# Patient Record
Sex: FEMALE | URBAN - METROPOLITAN AREA
[De-identification: names, ages, dates, MRNs, and addresses within clinical notes are randomized per-mention and may not be internally consistent; named-entity substitution may affect disease eponyms.]

---

## 2018-12-18 ENCOUNTER — APPOINTMENT (RX ONLY)
Dept: URBAN - METROPOLITAN AREA CLINIC 154 | Facility: CLINIC | Age: 38
Setting detail: DERMATOLOGY
End: 2018-12-18

## 2018-12-18 DIAGNOSIS — Z41.9 ENCOUNTER FOR PROCEDURE FOR PURPOSES OTHER THAN REMEDYING HEALTH STATE, UNSPECIFIED: ICD-10-CM

## 2018-12-18 PROCEDURE — ? FILLERS

## 2018-12-18 PROCEDURE — ? ICON IPL

## 2018-12-18 ASSESSMENT — LOCATION ZONE DERM: LOCATION ZONE: FACE

## 2018-12-18 ASSESSMENT — LOCATION DETAILED DESCRIPTION DERM
LOCATION DETAILED: RIGHT INFERIOR LATERAL FOREHEAD
LOCATION DETAILED: RIGHT LATERAL MALAR CHEEK
LOCATION DETAILED: LEFT LATERAL MALAR CHEEK
LOCATION DETAILED: LEFT INFERIOR LATERAL FOREHEAD

## 2018-12-18 ASSESSMENT — LOCATION SIMPLE DESCRIPTION DERM
LOCATION SIMPLE: LEFT FOREHEAD
LOCATION SIMPLE: RIGHT FOREHEAD
LOCATION SIMPLE: RIGHT CHEEK
LOCATION SIMPLE: LEFT CHEEK

## 2018-12-18 NOTE — PROCEDURE: FILLERS
Mid Face Filler  Volume In Cc: 0
Expiration Date (Month Year): 03/05/20
Include Cannula Information In Note?: No
Lot #: [id number]
Use Map Statement For Sites (Optional): Yes
Lot #: [id number]
Map Statment: See Attach Map for Complete Details
Tear Troughs Filler  Volume In Cc: 1
Expiration Date (Month Year): 08/04/19
Post-Care Instructions: Patient instructed to apply ice to reduce swelling.
Consent: Written consent obtained. Risks include but not limited to bruising, beading, irregular texture, ulceration, infection, allergic reaction, scar formation, incomplete augmentation, temporary nature, procedural pain.
Additional Area 1 Location: UPPER JOSE ORAL
Topical Anesthesia?: 23% lidocaine, 7% tetracaine
Filler: Restylane-L
Detail Level: Simple

## 2018-12-18 NOTE — PROCEDURE: ICON IPL
Detail Level: Zone
Anesthesia Volume In Cc: 0
Consent: Written consent obtained, risks reviewed including but not limited to crusting, scabbing, blistering, scarring, darker or lighter pigmentary change, bruising, and/or incomplete response.
Contact: Light
Passes: 1
External Cooling Fan Speed: 5
Post-Care Instructions: I reviewed with the patient in detail post-care instructions. Patient should stay away from the sun and wear sun protection until treated areas are fully healed.
Energy (Optional- Include Units): 15/34
Pre-Procedure Text: After consent was obtained, the treatment areas were cleansed and treated using the parameters mentioned above.

## 2020-05-06 ENCOUNTER — APPOINTMENT (RX ONLY)
Dept: URBAN - METROPOLITAN AREA CLINIC 154 | Facility: CLINIC | Age: 40
Setting detail: DERMATOLOGY
End: 2020-05-06

## 2020-05-06 DIAGNOSIS — Z41.9 ENCOUNTER FOR PROCEDURE FOR PURPOSES OTHER THAN REMEDYING HEALTH STATE, UNSPECIFIED: ICD-10-CM

## 2020-05-06 PROCEDURE — ? MICRONEEDLING

## 2020-05-06 PROCEDURE — ? FILLERS

## 2020-05-06 NOTE — PROCEDURE: MICRONEEDLING
Detail Level: Zone
Depth In Mm: 1.5
Infusions (Optional): PRP
Depth In Mm: 1
Post-Care Instructions: After the procedure, take precautions agains sun exposure. Do not apply sunscreen for 12 hours after the procedure. Do not apply make-up for 12 hours after the procedure. Avoid alcohol based toners for 10-14 days. After 2-3 days patients can return to their regular skin regimen.
Consent: Written consent obtained, risks reviewed including but not limited to pain, scarring, infection and incomplete improvement.  Patient understands the procedure is cosmetic in nature and will require out of pocket payment.
Depth In Mm: 2.5
Treatment Number (Optional): 0
Location #1: full face

## 2020-05-06 NOTE — PROCEDURE: FILLERS
Additional Area 3 Volume In Cc: 0
Lot #: [id number]
Detail Level: Simple
Filler: Restylane-L
Expiration Date (Month Year): 03/05/20
Include Cannula Information In Note?: No
Use Map Statement For Sites (Optional): Yes
Additional Area 1 Location: UPPER JOSE ORAL
Map Statment: See Attach Map for Complete Details
Tear Troughs Filler  Volume In Cc: 1
Lot #: [id number]
Expiration Date (Month Year): 09/10/20
Consent: Written consent obtained. Risks include but not limited to bruising, beading, irregular texture, ulceration, infection, allergic reaction, scar formation, incomplete augmentation, temporary nature, procedural pain.
Post-Care Instructions: Patient instructed to apply ice to reduce swelling.
Topical Anesthesia?: 23% lidocaine, 7% tetracaine

## 2020-06-03 ENCOUNTER — APPOINTMENT (RX ONLY)
Dept: URBAN - METROPOLITAN AREA CLINIC 154 | Facility: CLINIC | Age: 40
Setting detail: DERMATOLOGY
End: 2020-06-03

## 2020-06-03 DIAGNOSIS — Z41.9 ENCOUNTER FOR PROCEDURE FOR PURPOSES OTHER THAN REMEDYING HEALTH STATE, UNSPECIFIED: ICD-10-CM

## 2020-06-03 PROCEDURE — ? PLATELET RICH PLASMA INJECTION

## 2020-06-03 NOTE — PROCEDURE: PLATELET RICH PLASMA INJECTION
Depth In Mm (Will Not Render If 0): 0
Detail Level: Zone
Amount Injected At This Location In Cc (Will Not Render If 0): 10
Post-Care Instructions: After the procedure, take precautions agains sun exposure. Do not apply sunscreen for 12 hours after the procedure. Do not apply make-up for 12 hours after the procedure. Avoid alcohol based toners for 10-14 days. After 2-3 days patients can return to their regular skin regimen.
Show Additional Techniques: Yes
Which Technique?: Default
Treatment Number (Optional): 1
Amount Of Blood Collected (Optional - Include Units): 11
Location #1: under eye
Depth In Mm (Will Not Render If 0): 0.2
Consent: Written consent obtained, risks reviewed including but not limited to pain, scarring, infection and incomplete improvement.  Patient understands the procedure is cosmetic in nature and will require out of pocket payment.

## 2020-12-22 ENCOUNTER — APPOINTMENT (RX ONLY)
Dept: URBAN - METROPOLITAN AREA CLINIC 154 | Facility: CLINIC | Age: 40
Setting detail: DERMATOLOGY
End: 2020-12-22

## 2020-12-22 DIAGNOSIS — Z41.9 ENCOUNTER FOR PROCEDURE FOR PURPOSES OTHER THAN REMEDYING HEALTH STATE, UNSPECIFIED: ICD-10-CM

## 2020-12-22 PROCEDURE — ? PLATELET RICH PLASMA INJECTION

## 2022-08-08 ENCOUNTER — APPOINTMENT (RX ONLY)
Dept: URBAN - METROPOLITAN AREA CLINIC 154 | Facility: CLINIC | Age: 42
Setting detail: DERMATOLOGY
End: 2022-08-08

## 2022-08-08 DIAGNOSIS — Z41.9 ENCOUNTER FOR PROCEDURE FOR PURPOSES OTHER THAN REMEDYING HEALTH STATE, UNSPECIFIED: ICD-10-CM

## 2022-08-08 PROCEDURE — ? SCITON BBL HERO

## 2022-08-08 NOTE — PROCEDURE: SCITON BBL HERO
Filter: 515nm Filter
Pulse Width Units: milliseconds
Add Setting 5?: no
Treatment Number: 0
Pulse Width - Will Not Render If 0: 3
Anesthesia Type: 1% lidocaine with epinephrine
Procedure Note: After applying gel and applying protective eyeware, treatment was administered using the setting parameters listed above.
Temp (C): 25
Filter: 560nm Filter
Spot Size: Finesse Adapter Size: 15 x 15 mm square
Post-Care Instructions: I reviewed with the patient in detail post-care instructions. Patient should avoid sun exposure before and after treatment.  Patient should wear sunscreen.
Temp (C): 15
Rate (Hz): 4
Detail Level: Simple
Fluence (J/Cm2) - Will Not Render If 0: 5
Consent: Written consent obtained.  Risks reviewed including but not limited to crusting, scabbing, blistering, scarring, darker or lighter pigmentary change, and incomplete clearance.